# Patient Record
Sex: FEMALE | Race: WHITE | Employment: FULL TIME | URBAN - METROPOLITAN AREA
[De-identification: names, ages, dates, MRNs, and addresses within clinical notes are randomized per-mention and may not be internally consistent; named-entity substitution may affect disease eponyms.]

---

## 2017-03-03 PROBLEM — Z34.90 PREGNANCY: Status: ACTIVE | Noted: 2017-03-03

## 2017-03-04 ENCOUNTER — ANESTHESIA (OUTPATIENT)
Dept: LABOR AND DELIVERY | Age: 27
End: 2017-03-04
Payer: COMMERCIAL

## 2017-03-04 ENCOUNTER — ANESTHESIA EVENT (OUTPATIENT)
Dept: LABOR AND DELIVERY | Age: 27
End: 2017-03-04
Payer: COMMERCIAL

## 2017-03-04 PROCEDURE — 74011000250 HC RX REV CODE- 250

## 2017-03-04 PROCEDURE — 77030014125 HC TY EPDRL BBMI -B: Performed by: ANESTHESIOLOGY

## 2017-03-04 RX ORDER — LIDOCAINE HYDROCHLORIDE AND EPINEPHRINE 20; 5 MG/ML; UG/ML
INJECTION, SOLUTION EPIDURAL; INFILTRATION; INTRACAUDAL; PERINEURAL AS NEEDED
Status: DISCONTINUED | OUTPATIENT
Start: 2017-03-04 | End: 2017-03-04 | Stop reason: HOSPADM

## 2017-03-04 RX ORDER — BUPIVACAINE HYDROCHLORIDE 2.5 MG/ML
INJECTION, SOLUTION EPIDURAL; INFILTRATION; INTRACAUDAL AS NEEDED
Status: DISCONTINUED | OUTPATIENT
Start: 2017-03-04 | End: 2017-03-04 | Stop reason: HOSPADM

## 2017-03-04 RX ADMIN — LIDOCAINE HYDROCHLORIDE AND EPINEPHRINE 3 ML: 20; 5 INJECTION, SOLUTION EPIDURAL; INFILTRATION; INTRACAUDAL; PERINEURAL at 08:34

## 2017-03-04 RX ADMIN — BUPIVACAINE HYDROCHLORIDE 6 ML: 2.5 INJECTION, SOLUTION EPIDURAL; INFILTRATION; INTRACAUDAL at 08:34

## 2017-03-04 NOTE — ANESTHESIA PROCEDURE NOTES
Epidural Block    Start time: 3/4/2017 8:45 AM  End time: 3/4/2017 8:45 AM  Performed by: Andria Madison  Authorized by: Danuta MCELROY     Pre-Procedure  Indication: labor epidural    Preanesthetic Checklist: patient identified, risks and benefits discussed, anesthesia consent, timeout performed and anesthesia consent      Epidural:   Patient position:  Seated  Prep region:  Lumbar  Prep: Betadine    Location:  L3-4    Needle and Epidural Catheter:   Needle Type:  Tuohy  Needle Gauge:  17 G  Injection Technique:  Loss of resistance using saline  Attempts:  1  Catheter Size:  18 G  Events: no paresthesia and negative aspiration test    Test Dose:  Lidocaine 1.5% w/ epi and negative    Assessment:   Catheter Secured:  Tegaderm and tape  Insertion:  Uncomplicated  Patient tolerance:  Patient tolerated the procedure well with no immediate complications

## 2017-03-04 NOTE — ANESTHESIA PREPROCEDURE EVALUATION
Anesthetic History   No history of anesthetic complications            Review of Systems / Medical History  Patient summary reviewed, nursing notes reviewed and pertinent labs reviewed    Pulmonary  Within defined limits                 Neuro/Psych   Within defined limits           Cardiovascular  Within defined limits                Exercise tolerance: >4 METS     GI/Hepatic/Renal  Within defined limits              Endo/Other  Within defined limits           Other Findings              Physical Exam    Airway  Mallampati: II  TM Distance: 4 - 6 cm  Neck ROM: normal range of motion   Mouth opening: Normal     Cardiovascular               Dental         Pulmonary                 Abdominal         Other Findings            Anesthetic Plan    ASA: 2  Anesthesia type: epidural          Induction: Intravenous  Anesthetic plan and risks discussed with: Patient

## 2019-06-26 LAB
ANTIBODY SCREEN, EXTERNAL: NEGATIVE
HBSAG, EXTERNAL: NEGATIVE
HCT, EXTERNAL: 35.3
HGB, EXTERNAL: 12.2
HIV, EXTERNAL: NEGATIVE
PLATELET CNT,   EXTERNAL: 133
RPR, EXTERNAL: NORMAL
RUBELLA, EXTERNAL: NORMAL
TYPE, ABO & RH, EXTERNAL: NORMAL

## 2019-12-11 LAB — GRBS, EXTERNAL: NEGATIVE

## 2020-01-08 ENCOUNTER — HOSPITAL ENCOUNTER (INPATIENT)
Age: 30
LOS: 3 days | Discharge: HOME | End: 2020-01-11
Attending: OBSTETRICS & GYNECOLOGY | Admitting: OBSTETRICS & GYNECOLOGY
Payer: COMMERCIAL

## 2020-01-08 LAB
BASOPHILS # BLD: 0 K/UL (ref 0–0.1)
BASOPHILS NFR BLD: 0 % (ref 0–1)
DIFFERENTIAL METHOD BLD: ABNORMAL
EOSINOPHIL # BLD: 0.2 K/UL (ref 0–0.4)
EOSINOPHIL NFR BLD: 1 % (ref 0–7)
ERYTHROCYTE [DISTWIDTH] IN BLOOD BY AUTOMATED COUNT: 13.5 % (ref 11.5–14.5)
HCT VFR BLD AUTO: 39.4 % (ref 35–47)
HGB BLD-MCNC: 14 G/DL (ref 11.5–16)
IMM GRANULOCYTES # BLD AUTO: 0.1 K/UL (ref 0–0.04)
IMM GRANULOCYTES NFR BLD AUTO: 1 % (ref 0–0.5)
LYMPHOCYTES # BLD: 1.8 K/UL (ref 0.8–3.5)
LYMPHOCYTES NFR BLD: 13 % (ref 12–49)
MCH RBC QN AUTO: 33.9 PG (ref 26–34)
MCHC RBC AUTO-ENTMCNC: 35.5 G/DL (ref 30–36.5)
MCV RBC AUTO: 95.4 FL (ref 80–99)
MONOCYTES # BLD: 0.6 K/UL (ref 0–1)
MONOCYTES NFR BLD: 5 % (ref 5–13)
NEUTS SEG # BLD: 10.8 K/UL (ref 1.8–8)
NEUTS SEG NFR BLD: 80 % (ref 32–75)
NRBC # BLD: 0 K/UL (ref 0–0.01)
NRBC BLD-RTO: 0 PER 100 WBC
PLATELET # BLD AUTO: 143 K/UL (ref 150–400)
PMV BLD AUTO: 10.1 FL (ref 8.9–12.9)
RBC # BLD AUTO: 4.13 M/UL (ref 3.8–5.2)
WBC # BLD AUTO: 13.5 K/UL (ref 3.6–11)

## 2020-01-08 PROCEDURE — 85025 COMPLETE CBC W/AUTO DIFF WBC: CPT

## 2020-01-08 PROCEDURE — 75410000003 HC RECOV DEL/VAG/CSECN EA 0.5 HR: Performed by: OBSTETRICS & GYNECOLOGY

## 2020-01-08 PROCEDURE — 75410000000 HC DELIVERY VAGINAL/SINGLE: Performed by: OBSTETRICS & GYNECOLOGY

## 2020-01-08 PROCEDURE — 36415 COLL VENOUS BLD VENIPUNCTURE: CPT

## 2020-01-08 PROCEDURE — 76060000078 HC EPIDURAL ANESTHESIA: Performed by: NURSE ANESTHETIST, CERTIFIED REGISTERED

## 2020-01-08 PROCEDURE — 77030018846 HC SOL IRR STRL H20 ICUM -A

## 2020-01-08 PROCEDURE — 74011250636 HC RX REV CODE- 250/636: Performed by: OBSTETRICS & GYNECOLOGY

## 2020-01-08 PROCEDURE — 59200 INSERT CERVICAL DILATOR: CPT | Performed by: OBSTETRICS & GYNECOLOGY

## 2020-01-08 PROCEDURE — 77030028565 HC CATH CERV RIPNG BLN COOK -B

## 2020-01-08 PROCEDURE — 75410000002 HC LABOR FEE PER 1 HR: Performed by: OBSTETRICS & GYNECOLOGY

## 2020-01-08 PROCEDURE — 65270000029 HC RM PRIVATE

## 2020-01-08 RX ORDER — OXYTOCIN/0.9 % SODIUM CHLORIDE 30/500 ML
0-20 PLASTIC BAG, INJECTION (ML) INTRAVENOUS
Status: DISCONTINUED | OUTPATIENT
Start: 2020-01-09 | End: 2020-01-11 | Stop reason: HOSPADM

## 2020-01-08 RX ORDER — SODIUM CHLORIDE 0.9 % (FLUSH) 0.9 %
5-40 SYRINGE (ML) INJECTION AS NEEDED
Status: DISCONTINUED | OUTPATIENT
Start: 2020-01-08 | End: 2020-01-11 | Stop reason: HOSPADM

## 2020-01-08 RX ORDER — ONDANSETRON 2 MG/ML
4 INJECTION INTRAMUSCULAR; INTRAVENOUS
Status: DISCONTINUED | OUTPATIENT
Start: 2020-01-08 | End: 2020-01-09 | Stop reason: SDUPTHER

## 2020-01-08 RX ORDER — SODIUM CHLORIDE, SODIUM LACTATE, POTASSIUM CHLORIDE, CALCIUM CHLORIDE 600; 310; 30; 20 MG/100ML; MG/100ML; MG/100ML; MG/100ML
125 INJECTION, SOLUTION INTRAVENOUS CONTINUOUS
Status: DISCONTINUED | OUTPATIENT
Start: 2020-01-09 | End: 2020-01-11 | Stop reason: HOSPADM

## 2020-01-08 RX ORDER — SODIUM CHLORIDE 0.9 % (FLUSH) 0.9 %
5-40 SYRINGE (ML) INJECTION EVERY 8 HOURS
Status: DISCONTINUED | OUTPATIENT
Start: 2020-01-08 | End: 2020-01-11 | Stop reason: HOSPADM

## 2020-01-08 RX ORDER — OXYTOCIN/0.9 % SODIUM CHLORIDE 30/500 ML
0-20 PLASTIC BAG, INJECTION (ML) INTRAVENOUS
Status: DISCONTINUED | OUTPATIENT
Start: 2020-01-09 | End: 2020-01-08

## 2020-01-08 RX ORDER — LORATADINE 10 MG/1
10 TABLET ORAL
COMMUNITY

## 2020-01-08 RX ORDER — AMOXICILLIN 200 MG/5ML
200 SUSPENSION, RECONSTITUTED, ORAL (ML) ORAL 2 TIMES DAILY
COMMUNITY
End: 2020-01-11

## 2020-01-08 RX ORDER — SODIUM CHLORIDE, SODIUM LACTATE, POTASSIUM CHLORIDE, CALCIUM CHLORIDE 600; 310; 30; 20 MG/100ML; MG/100ML; MG/100ML; MG/100ML
1000 INJECTION, SOLUTION INTRAVENOUS ONCE
Status: COMPLETED | OUTPATIENT
Start: 2020-01-08 | End: 2020-01-08

## 2020-01-08 RX ORDER — NALOXONE HYDROCHLORIDE 0.4 MG/ML
0.4 INJECTION, SOLUTION INTRAMUSCULAR; INTRAVENOUS; SUBCUTANEOUS AS NEEDED
Status: DISCONTINUED | OUTPATIENT
Start: 2020-01-08 | End: 2020-01-09 | Stop reason: SDUPTHER

## 2020-01-08 RX ADMIN — SODIUM CHLORIDE, SODIUM LACTATE, POTASSIUM CHLORIDE, AND CALCIUM CHLORIDE 1000 ML: 600; 310; 30; 20 INJECTION, SOLUTION INTRAVENOUS at 13:37

## 2020-01-08 RX ADMIN — SODIUM CHLORIDE, SODIUM LACTATE, POTASSIUM CHLORIDE, AND CALCIUM CHLORIDE: 600; 310; 30; 20 INJECTION, SOLUTION INTRAVENOUS at 23:40

## 2020-01-08 NOTE — PROGRESS NOTES
1251 - Patient arrived ambulatory from office with reports of 6/8 BPP (for breathing). Patient and FOB state this same situation happened with their first baby 3 years ago. Patient is scheduled for an induction Friday. Patient denies any LOF or vaginal bleeding. Patient is currently on amoxicillin for a sinus infection, last took tylenol cold yesterday. Patient reports eating and drinking normally. 1255 - Patient tilted on left side with pillow support. 1305 - Patient tilted on right side with pillow support. Patient is javier on monitor but not feeling any contractions at this time. 1310 - FHR tracing reviewed with Dr. Clair Nyhan, as Dr. Joselito Joya is currently in OR. Pattie Rodríguez from MD for PIV, labs and IVF bolus for FHR baseline 150-155 with prolonged accelerations into the 70's.     1332 - PIV inserted in left hand on second attempt, labs drawn on first attempt and sent, IVF bolus initiated. [address] from Dr. Joselito Joya to admit patient for induction of labor. 80 - Dr. Joselito Joya at bedside discussing plan of care. SVE = 2-3/75/-2. Cook catheter placed by Dr. Joselito Joya, 60ml/60ml. VORB for EFM x1 hr post nielson placement and pitocin titration at midnight. 1900 - Bedside and Verbal shift change report given to Hal Ibanez RN (oncoming nurse) by Caren Mckeon RN (offgoing nurse). Report included the following information SBAR, Procedure Summary, Intake/Output, MAR, Recent Results and Med Rec Status.

## 2020-01-08 NOTE — H&P
History & Physical    Name: Maudie Gilford MRN: [id number]  SSN: [id number]    YOB: 1990  Age: 30 y.o. Sex: female      Subjective:     Estimated Date of Delivery: 20  OB History    Para Term  AB Living   2 1 1     1   SAB TAB Ectopic Molar Multiple Live Births             1      # Outcome Date GA Lbr Ayden/2nd Weight Sex Delivery Anes PTL Lv   2 Current            1 Term 17 40w1d   F Vag-Spont  N MEGHNA       Ms. Marie is admitted with pregnancy at 40w2d for induction of labor due to  BPP in the office today. Additionally has a favorable cervix. Prenatal course was normal.  Please see prenatal records for details. No past medical history on file. No past surgical history on file. Social History     Occupational History    Not on file   Tobacco Use    Smoking status: Never Smoker   Substance and Sexual Activity    Alcohol use: No    Drug use: No    Sexual activity: Yes     Partners: Male     No family history on file. Allergies   Allergen Reactions    Sulfa (Sulfonamide Antibiotics) Hives     Prior to Admission medications    Medication Sig Start Date End Date Taking? Authorizing Provider   loratadine (CLARITIN) 10 mg tablet Take 10 mg by mouth. Yes Provider, Historical   amoxicillin (AMOXIL) 200 mg/5 mL suspension Take 200 mg by mouth two (2) times a day. Indications: Acute Bacterial Infection of the Sinuses   Yes Provider, Historical   PNV no.24-iron-folic acid-dha (PRENATAL DHA+COMPLETE PRENATAL) -300 mg-mcg-mg cmpk Take 1 Tab by mouth. Yes Provider, Historical   ibuprofen (MOTRIN) 600 mg tablet Take 1 Tab by mouth every six (6) hours as needed. 3/6/17   Jeromy Diaz MD        Review of Systems: A comprehensive review of systems was negative except for that written in the History of Present Illness.     Objective:     Vitals:  Vitals:    20 1257 20 1259   BP:  111/69   Pulse:  78   Resp:  14   Temp:  98.3 °F (36.8 °C)   Weight: 73.5 kg (162 lb)    Height: 5' 4\" (1.626 m)         Physical Exam:  Patient without distress. Heart: Regular rate and rhythm  Lung: normal respiratory effort  Abdomen: soft, nontender  Fundus: soft and non tender  Perineum: blood absent, amniotic fluid absent  Cervical Exam: 2-3/75/-2; cook catheter placed easily and filled with 60ml NS x2   Lower Extremities: WWP   Membranes:  Intact  Fetal Heart Rate: Reactive          Prenatal Labs:   Lab Results   Component Value Date/Time    Rubella, External Immune 08/10/2016    HBsAg, External Negative 08/10/2016    HIV, External Negative 08/10/2016    ABO,Rh O Positive 08/10/2016    GrBStrep, External negative 02/10/2017       Impression/Plan:     Active Problems:  IOL @ 40w2d    BPP        Plan: Admit for induction of labor. Group B Strep negative. FB in place overnight.    Pit per protocol in the AM    CEFM/[place]  Consented  IVF/Clears  Pain management if desired   Expect        Jorge Zafar MD  [facility]

## 2020-01-09 ENCOUNTER — ANESTHESIA EVENT (OUTPATIENT)
Dept: LABOR AND DELIVERY | Age: 30
End: 2020-01-09
Payer: COMMERCIAL

## 2020-01-09 ENCOUNTER — ANESTHESIA (OUTPATIENT)
Dept: LABOR AND DELIVERY | Age: 30
End: 2020-01-09
Payer: COMMERCIAL

## 2020-01-09 PROCEDURE — 74011250636 HC RX REV CODE- 250/636: Performed by: OBSTETRICS & GYNECOLOGY

## 2020-01-09 PROCEDURE — 74011000250 HC RX REV CODE- 250: Performed by: NURSE ANESTHETIST, CERTIFIED REGISTERED

## 2020-01-09 PROCEDURE — 77030014125 HC TY EPDRL BBMI -B: Performed by: NURSE ANESTHETIST, CERTIFIED REGISTERED

## 2020-01-09 PROCEDURE — 0KQM0ZZ REPAIR PERINEUM MUSCLE, OPEN APPROACH: ICD-10-PCS | Performed by: OBSTETRICS & GYNECOLOGY

## 2020-01-09 PROCEDURE — 00HU33Z INSERTION OF INFUSION DEVICE INTO SPINAL CANAL, PERCUTANEOUS APPROACH: ICD-10-PCS | Performed by: ANESTHESIOLOGY

## 2020-01-09 PROCEDURE — 65270000029 HC RM PRIVATE

## 2020-01-09 PROCEDURE — 74011250637 HC RX REV CODE- 250/637: Performed by: OBSTETRICS & GYNECOLOGY

## 2020-01-09 PROCEDURE — 3E033VJ INTRODUCTION OF OTHER HORMONE INTO PERIPHERAL VEIN, PERCUTANEOUS APPROACH: ICD-10-PCS | Performed by: OBSTETRICS & GYNECOLOGY

## 2020-01-09 PROCEDURE — 75410000002 HC LABOR FEE PER 1 HR: Performed by: OBSTETRICS & GYNECOLOGY

## 2020-01-09 PROCEDURE — 10907ZC DRAINAGE OF AMNIOTIC FLUID, THERAPEUTIC FROM PRODUCTS OF CONCEPTION, VIA NATURAL OR ARTIFICIAL OPENING: ICD-10-PCS | Performed by: OBSTETRICS & GYNECOLOGY

## 2020-01-09 PROCEDURE — 74011000250 HC RX REV CODE- 250: Performed by: ANESTHESIOLOGY

## 2020-01-09 RX ORDER — EPHEDRINE SULFATE/0.9% NACL/PF 50 MG/5 ML
10 SYRINGE (ML) INTRAVENOUS
Status: COMPLETED | OUTPATIENT
Start: 2020-01-09 | End: 2020-01-09

## 2020-01-09 RX ORDER — NALOXONE HYDROCHLORIDE 0.4 MG/ML
0.4 INJECTION, SOLUTION INTRAMUSCULAR; INTRAVENOUS; SUBCUTANEOUS AS NEEDED
Status: DISCONTINUED | OUTPATIENT
Start: 2020-01-09 | End: 2020-01-11 | Stop reason: HOSPADM

## 2020-01-09 RX ORDER — LIDOCAINE HYDROCHLORIDE AND EPINEPHRINE 15; 5 MG/ML; UG/ML
INJECTION, SOLUTION EPIDURAL
Status: SHIPPED | OUTPATIENT
Start: 2020-01-09 | End: 2020-01-09

## 2020-01-09 RX ORDER — BUPIVACAINE HYDROCHLORIDE 2.5 MG/ML
INJECTION, SOLUTION EPIDURAL; INFILTRATION; INTRACAUDAL AS NEEDED
Status: DISCONTINUED | OUTPATIENT
Start: 2020-01-09 | End: 2020-01-09 | Stop reason: HOSPADM

## 2020-01-09 RX ORDER — DIPHENHYDRAMINE HCL 25 MG
25 CAPSULE ORAL
Status: DISCONTINUED | OUTPATIENT
Start: 2020-01-09 | End: 2020-01-11 | Stop reason: HOSPADM

## 2020-01-09 RX ORDER — HYDROCORTISONE ACETATE PRAMOXINE HCL 2.5; 1 G/100G; G/100G
CREAM TOPICAL AS NEEDED
Status: DISCONTINUED | OUTPATIENT
Start: 2020-01-09 | End: 2020-01-11 | Stop reason: HOSPADM

## 2020-01-09 RX ORDER — OXYTOCIN/0.9 % SODIUM CHLORIDE 20/1000 ML
125-500 PLASTIC BAG, INJECTION (ML) INTRAVENOUS ONCE
Status: COMPLETED | OUTPATIENT
Start: 2020-01-09 | End: 2020-01-09

## 2020-01-09 RX ORDER — IBUPROFEN 800 MG/1
800 TABLET ORAL EVERY 8 HOURS
Status: DISCONTINUED | OUTPATIENT
Start: 2020-01-09 | End: 2020-01-11 | Stop reason: HOSPADM

## 2020-01-09 RX ORDER — EPHEDRINE SULFATE/0.9% NACL/PF 50 MG/5 ML
10 SYRINGE (ML) INTRAVENOUS ONCE
Status: COMPLETED | OUTPATIENT
Start: 2020-01-09 | End: 2020-01-09

## 2020-01-09 RX ORDER — FENTANYL/BUPIVACAINE/NS/PF 2-1250MCG
10 PREFILLED PUMP RESERVOIR EPIDURAL CONTINUOUS
Status: DISCONTINUED | OUTPATIENT
Start: 2020-01-09 | End: 2020-01-09 | Stop reason: HOSPADM

## 2020-01-09 RX ORDER — SIMETHICONE 80 MG
80 TABLET,CHEWABLE ORAL
Status: DISCONTINUED | OUTPATIENT
Start: 2020-01-09 | End: 2020-01-11 | Stop reason: HOSPADM

## 2020-01-09 RX ORDER — METHYLERGONOVINE MALEATE 0.2 MG/ML
0.2 INJECTION INTRAVENOUS ONCE
Status: COMPLETED | OUTPATIENT
Start: 2020-01-09 | End: 2020-01-09

## 2020-01-09 RX ORDER — DOCUSATE SODIUM 100 MG/1
100 CAPSULE, LIQUID FILLED ORAL
Status: DISCONTINUED | OUTPATIENT
Start: 2020-01-09 | End: 2020-01-11 | Stop reason: HOSPADM

## 2020-01-09 RX ORDER — ONDANSETRON 2 MG/ML
4 INJECTION INTRAMUSCULAR; INTRAVENOUS
Status: DISCONTINUED | OUTPATIENT
Start: 2020-01-09 | End: 2020-01-11 | Stop reason: HOSPADM

## 2020-01-09 RX ADMIN — METHYLERGONOVINE MALEATE 0.2 MG: 0.2 INJECTION INTRAVENOUS at 14:14

## 2020-01-09 RX ADMIN — IBUPROFEN 800 MG: 800 TABLET ORAL at 16:58

## 2020-01-09 RX ADMIN — Medication 10 MG: at 08:49

## 2020-01-09 RX ADMIN — SODIUM CHLORIDE, SODIUM LACTATE, POTASSIUM CHLORIDE, AND CALCIUM CHLORIDE 500 ML/HR: 600; 310; 30; 20 INJECTION, SOLUTION INTRAVENOUS at 08:22

## 2020-01-09 RX ADMIN — SODIUM CHLORIDE, SODIUM LACTATE, POTASSIUM CHLORIDE, AND CALCIUM CHLORIDE 125 ML/HR: 600; 310; 30; 20 INJECTION, SOLUTION INTRAVENOUS at 09:36

## 2020-01-09 RX ADMIN — SODIUM CHLORIDE, SODIUM LACTATE, POTASSIUM CHLORIDE, AND CALCIUM CHLORIDE 999 ML/HR: 600; 310; 30; 20 INJECTION, SOLUTION INTRAVENOUS at 07:19

## 2020-01-09 RX ADMIN — OXYTOCIN 5000 MILLI-UNITS/HR: 10 INJECTION, SOLUTION INTRAMUSCULAR; INTRAVENOUS at 14:59

## 2020-01-09 RX ADMIN — BUPIVACAINE HYDROCHLORIDE 2 ML: 2.5 INJECTION, SOLUTION EPIDURAL; INFILTRATION; INTRACAUDAL; PERINEURAL at 09:55

## 2020-01-09 RX ADMIN — Medication 10 MG: at 08:44

## 2020-01-09 RX ADMIN — LIDOCAINE HYDROCHLORIDE AND EPINEPHRINE 2 ML: 15; 5 INJECTION, SOLUTION EPIDURAL at 08:33

## 2020-01-09 RX ADMIN — BUPIVACAINE HYDROCHLORIDE 3 ML: 2.5 INJECTION, SOLUTION EPIDURAL; INFILTRATION; INTRACAUDAL; PERINEURAL at 08:36

## 2020-01-09 RX ADMIN — LIDOCAINE HYDROCHLORIDE AND EPINEPHRINE 3 ML: 15; 5 INJECTION, SOLUTION EPIDURAL at 08:30

## 2020-01-09 RX ADMIN — Medication 2 MILLI-UNITS/MIN: at 03:52

## 2020-01-09 RX ADMIN — SODIUM CHLORIDE, SODIUM LACTATE, POTASSIUM CHLORIDE, AND CALCIUM CHLORIDE 125 ML/HR: 600; 310; 30; 20 INJECTION, SOLUTION INTRAVENOUS at 03:52

## 2020-01-09 RX ADMIN — Medication 8 ML/HR: at 09:38

## 2020-01-09 NOTE — ROUTINE PROCESS
0700: Bedside, Verbal and Written shift change report given to MAXINE Pineda, RN (oncoming nurse) by OLYA Manzo, DARON (offgoing nurse). Report included the following information SBAR, Kardex, Intake/Output, MAR, Accordion and Recent Results. 9524: Pt requesting pre-epidural fluid bolus be started at this time    0727: This RN updating MAURICE Rooney on pt epidural request. This RN to call CRNA when 1 liter LR IV fluid bolus completed. 6467: Dr. Joselito Joya calling for patient update. This RN updating MD on pt status, EFM, pitocin, and pt bolusing for epidural. MD verbalizing understanding and stating that she will be at pt bedside around 0815. Plan is for MD to remove cook catheter and assess pt cervix. 5227: MAURICE Rooney at pt bedside at this time    0830: Test dose at this time per MAURICE Rooney    4876: Dr. Joselito Joya at pt bedside at this time for pt assessment    0836: MD removing cook catheter at this time, pt tolerating well    0838: SVE per MD, 7-8/90/-2. AROM per MD, moderate amount of clear fluid noted. Regina care performed and pad changed. 0840: Dr. Kelsey Paulino increase pitocin to 10mL/hr at this time due to pt assessment of mild contraction during SVE. MD discussing plan of care with pt at this time, pt verbalizing agreement    0843: Dr. Joselito Joya leaving pt bedside at this time    8805: Pt stating that she feels nauseated, this RN reassessing bp. 72/43 noted. This RN calling MAURICE Rooney to update on pt status and administering 10mg ephedrine at this time    0845: MAURICE Rooney at pt bedside a this time performing pt assessment    0846: Pt bp noted to be 109/61. No concerns by CRNA at this time. 5720: Pt stating that her nausea is subsiding    0849: Pt stating she is beginning to feel nauseated again. This RN reassessing bp, 88/57 noted. This RN calling MAURICE Rooney to update on pt status.  CRNA TORB additional 10mg ephedrine IV and CRNA on her way to assess pt    0852: BP retake is 140/81, this RN switching cuff to L arm due to pt laying on cuff on R arm/Rside. Dr. Landen Hall at pt bedside at this time for pt assessment. Pt stating she feels \"much better. \"     3228: BP retake is 123/61. MD verbalizing no concerns at this time. MD VORB modify epidural order to continuous rate of 8mL/hr. MD leaving pt bedside at this time    [phone]: This RN calling Dr. Steffanie Baxter and updating MD on pt status and EFM/fetal tachycardia since ephedrine. MD verbalizing understanding and VORB continue pitocin titration. No further orders received at this time    1025: Pt informing this RN that she is feeling increasing discomfort with her contractions    1027: This RN calling MAURICE Foster and updating on pt status and increasing discomfort. CRNA TORB have pt hit epidural bolus button and increase continuous rate to 10mL/hr. CRNA to come assess pt shortly    1028: SVE per this RN due to pt increasing pain with contractions and FHR    1050: MAURICE Foster at pt bedside at this time assessing pt pain and level and administering small bolus at this time for pt pain control. Pt educated by CRNA to inform RN or CRNA if pt nausea returns     1140: Dr. Sonny Flynn this RN to perform SVE at this time, MD to check pt chart following SVE    1144: SVE per this RN, 10/100/-1 noted. This RN to assist pt with side lying hip release at this time    1159: Dr. Steffanie Baxter at pt bedside at this time    1200: Dr. Steffanie Baxter at pt perineum. Pt in lithotomy position with legs in stirrups. Pt educated how to push and now bearing down with each contraction    1207: Dr. Steffanie Baxter discussing plan of care with patient. MD stating that plan is to take break from pushing and restart pushing when lower fetal station/when pt is feeling rectal pressure and urge to push. Pt verbalizing agreement with plan of care    1230: Dr. Steffanie Baxter leaving nurses station at this time. Plan is to allow pt to rest, to restart pushing when appropriate. Dr. Richardson Son to care to pt until later in the afternoon.  Dr. Steffanie Baxter to call this RN for pt update later in the afternoon. 1327: SVE due to FHR and pt complaint per GELY Mendez RN that pt is beginning to experience discomfort with contractions. 10/+1 noted. 1332: This RN calling Dr. Edilberto Bai to update on pt status, EFM/fetal continued fetal tachycardia, pt mild rectal pressure, and SVE with +1 station. MD stating she will review tracing and come to nurses station to further discuss plan of care    1339: Dr. Edilberto Bai reviewing EFM at nurses station at this time. MD stating she will go to pt bedside to assess pt and discuss plan of care    1341: Dr. Edilberto Bai at pt bedside at this time discussing plan of care. Plan is to start pushing with MD at this time, pt verbalizing agreement. 1346: Pt in lithotomy position with legs in stirrups, pt educated how to push, pt now bearing down with each contraction. MD remaining at pt perineum    1400: Infant head out, MD assessing for nuchals, 1 loose nuchal noted and reduced.  viable female infant, infant placed on maternal abdomen and dried and sitmulated    36: Dr. Raquel Pollock 0.2mg methergine IM for postpartum bleeding. MD and this RN providing continuous fundal massage. 1408: This RN and MD noting fundus now firm and bleeding scant/small. MD confirming still administer methergine IM    1414: Brayden Newton RN administering 0.2mg methergine IM at this time    1415: Dr. Edilberto Bai leaving pt bedside at this time    1502: PPP titrated down to 125mL/hr    1720: TRANSFER - OUT REPORT:    Verbal report given to ALEX Kendrick RN (name) on Cely Brumfield  being transferred to MIU (unit) for routine progression of care       Report consisted of patients Situation, Background, Assessment and   Recommendations(SBAR). Information from the following report(s) SBAR, Kardex, Intake/Output, MAR, Accordion and Recent Results was reviewed with the receiving nurse.     Lines:   Peripheral IV 20 Left;Posterior Hand (Active)   Site Assessment Clean, dry, & intact 1/9/2020  2:25 PM   Phlebitis Assessment 0 1/9/2020  2:25 PM   Infiltration Assessment 0 1/9/2020  2:25 PM   Dressing Status Clean, dry, & intact 1/9/2020  2:25 PM   Dressing Type Tape;Transparent 1/9/2020  2:25 PM   Hub Color/Line Status Pink 1/9/2020  2:25 PM   Action Taken Blood drawn 1/8/2020  1:37 PM   Alcohol Cap Used Yes 1/9/2020  2:25 PM        Opportunity for questions and clarification was provided.       Patient transported with:   Registered Nurse  Tech

## 2020-01-09 NOTE — L&D DELIVERY NOTE
Delivery Note:     Patient reached FD and pushed with good effort to deliver the fetal head in SHAMA position. 1 nuchal cord found easily reduced on the perineum. The anterior shoulder, followed by the posterior shoulder and the rest of the body then delivered easily. This was a GIRL with Apgars of 8 and 9 at 1 and 5 minutes respectively, weight pending. The infant was placed on mom's abdomen. The cord was then double clamped and cut by the FOB. Cord blood was taken. The placenta followed spontaneously, intact, with 3VC. Pitocin was added to the IVF and the fundus was initially boggy to palpation with bleeding present. Bimanual massage performed and tone improved. 1 dose of IM methergine given. The vagina, cervix and perineum were examined and 2nd degree laceration was found and repaired to hemostasis using 3-0 vicryl suture.  mL. No complications. Mom and baby doing well. Dr. Garcia Hard delivering. [facility], [address] [place] [address],[address] [facility]     Delivery Summary    Patient: Ledy Macdonald MRN: [id number]  SSN: [id number]    YOB: 1990  Age: 30 y.o. Sex: female       Information for the patient's :  Fito Peters, Female Delmon Mole [[id number]]       Labor Events:    Labor: No    Steroids: None   Cervical Ripening Date/Time:       Cervical Ripening Type: Flores/EASI   Antibiotics During Labor: No   Rupture Identifier:      Rupture Date/Time: 2020 8:38 AM   Rupture Type: AROM   Amniotic Fluid Volume: Moderate    Amniotic Fluid Description: Clear    Amniotic Fluid Odor:      Induction: Flores Bulb (balloon); Oxytocin;AROM       Induction Date/Time: 2020 3:52 AM    Indications for Induction: Other(comment)    Augmentation: None   Augmentation Date/Time:      Indications for Augmentation:     Labor complications:  Other (comment)       Additional complications:        Delivery Events:  Indications For Episiotomy:     Episiotomy: None   Perineal Laceration(s):     Repaired:     Periurethral Laceration Location:      Repaired:     Labial Laceration Location:     Repaired:     Sulcal Laceration Location:     Repaired:     Vaginal Laceration Location:     Repaired:     Cervical Laceration Location:     Repaired:     Repair Suture: Vicryl 3-0   Number of Repair Packets: 1   Estimated Blood Loss (ml):  ml     Delivery Date: 2020    Delivery Time: 2:00 PM  Delivery Type: Vaginal, Spontaneous  Sex:  Female    Gestational Age: 44w3d   Delivery Clinician:  Janett Nunes  Living Status: Living   Delivery Location: L&D            APGARS  One minute Five minutes Ten minutes   Skin color: 0   1        Heart rate: 2   2        Grimace: 2   2        Muscle tone: 2   2        Breathin   2        Totals: 8   9            Presentation: Vertex    Position:   Occiput    Resuscitation Method:  Suctioning-bulb; Tactile Stimulation     Meconium Stained: Terminal      Cord Information: 3 Vessels  Complications: Nuchal Cord With Compressions  Cord around: head  Delayed cord clamping? Yes  Cord clamped date/time:2020  2:01 PM  Disposition of Cord Blood: Lab    Blood Gases Sent?: No    Placenta:  Date/Time:    Removal: Expressed      Appearance: Normal     [place] Measurements:  Birth Weight:        Birth Length:        Head Circumference:        Chest Circumference:       Abdominal Girth: Other Providers:   MARIE Poon;OSIEL MARIN JENNIFER Judee Spiegel, Obstetrician;Primary Nurse;Primary  Nurse;Nursery Nurse;Charge Nurse           Group B Strep:   Lab Results   Component Value Date/Time    GrBStrep, External negative 2019     Information for the patient's :  Ajith Nevarez, Female Jose Cohn [[id number]]   No results found for: ABORH, PCTABR, PCTDIG, BILI, ABORHEXT, ABORH    No results for input(s): PCO2CB, PO2CB, HCO3I, SO2I, IBD, PTEMPI, SPECTI, PHICB, ISITE, IDEV, IALLEN in the last 72 hours.

## 2020-01-09 NOTE — ANESTHESIA PROCEDURE NOTES
Epidural Block    Start time: 1/9/2020 8:15 AM  End time: 1/9/2020 8:41 AM  Performed by: Kavon Nguyen CRNA  Authorized by: Kavon Nguyen CRNA     Pre-Procedure  Indication: labor epidural    Preanesthetic Checklist: patient identified, risks and benefits discussed, anesthesia consent, patient being monitored, timeout performed and anesthesia consent    Timeout Time: 08:15        Epidural:   Patient position:  Seated  Prep region:  Lumbar  Prep: Chlorhexidine    Location:  L3-4    Needle and Epidural Catheter:   Needle Type:  Tuohy  Needle Gauge:  17 G  Injection Technique:  Loss of resistance using saline  Attempts:  1  Catheter Size:  20 G  Catheter at Skin Depth (cm):  11  Depth in Epidural Space (cm):  5  Events: no blood with aspiration, no cerebrospinal fluid with aspiration, no paresthesia and negative aspiration test    Test Dose:  Negative    Assessment:   Catheter Secured:  Tape and tegaderm  Insertion:  Uncomplicated

## 2020-01-09 NOTE — PROGRESS NOTES
Labor Note    Crystal Parra  [id number]  1990   40w3d      S:  Feeling pain and just got epidural.      O:    Visit Vitals  /79   Pulse 100   Temp 98.2 °F (36.8 °C)   Resp 14   Ht 5' 4\" (1.626 m)   Wt 73.5 kg (162 lb)   SpO2 99%   BMI 27.81 kg/m²     Cervical Exam  Dilation (cm): 7.5  Eff: 90 %  Station: -2  Vaginal exam done by? : Dr. Sandra Viera Status: AROM - clear   FB easily moved. Patient Vitals for the past 4 hrs: Mode Fetal Heart Rate Variability Decelerations Accelerations RN Reviewed Strip? Provider who reviewed strip? FHR Interventions   20 0840 External 160 6-25 BPM None Yes Yes Dr. Natalio Jin    20 0830 External 150 6-25 BPM None Yes Yes Dr. Sadia Senior   20 0815 External 145 (!) >25 BPM None Yes Yes     20 0800 External 150 6-25 BPM None Yes Yes     20 0745 External 145 6-25 BPM None Yes Yes     20 0730 External 145 6-25 BPM None Yes Yes     20 0715 External 140 6-25 BPM None Yes Yes     20 0700 External 145 6-25 BPM None No Yes     20 0645 External 145 6-25 BPM None Yes Yes     20 0630 External 135 6-25 BPM None Yes Yes     20 0615 External 135 6-25 BPM None Yes Yes     20 0600 External 140 6-25 BPM None No Yes     20 0545 External 140 6-25 BPM None Yes Yes     20 0530 External 140 6-25 BPM None Yes Yes  Lateral Right   20 0515 External 140 6-25 BPM None Yes Yes     20 0500 External 140 6-25 BPM None No Yes         A/P:  30 y.o.  @ 40w3d - labor   1. CEFM/[place]  2. GBS - / Rh+  3. Pitocin per protocol   4. Pain control - epidural   5. Genoveva prn. Expect .       Suma Leggett MD  [facility]

## 2020-01-09 NOTE — PROGRESS NOTES
1900  Bedside and Verbal shift change report given to MARIANA Gauthier RN (oncoming nurse) by Vanesa Rivera RN (offgoing nurse). Report included the following information SBAR, Kardex, Procedure Summary, Intake/Output, MAR, Accordion, Recent Results and Med Rec Status. Patient is resting comfortably in bed. EFM and TOCO applied. VS taken and patient assessment completed. Narda Osgood catheter in place, minor spotting noted. [facility]  Dr Yaya Schilling is updated on patient progress. Per Dr. Reilly Stank may be started at 2 am or 4 am, depending on strip and patient. 0  Dr Yaya Schilling updated on contraction pattern. Dr Cintia Anthony also reviewing contraction pattern, and pitocin will not be started at this time. 2340  Non reactive strip. Bolus of LR started. 0010  Reactive strip. Fluid bolus finished. 9155  Patient round. Fluids started. Cook catheter checked. VS and patient assessment    0352  Pitocin is started. 5096  Patient ambulates to bathroom. Pulse oximeter changed from right hand ring finger to left hand, middle finger. Nail polish present on fingers, pulse ox did not read well on right hand. 0700  Bedside and Verbal shift change report given to Landon Latham RN (oncoming nurse) by Samuel Witt RN (offgoing nurse). Report included the following information SBAR, Kardex, Procedure Summary, Intake/Output, MAR, Accordion, Recent Results and Med Rec Status.

## 2020-01-09 NOTE — ROUTINE PROCESS
TRANSFER - IN REPORT:    Verbal report received from [place], RN(name) on [place]  being received from L&D(unit) for routine progression of care      Report consisted of patients Situation, Background, Assessment and   Recommendations(SBAR). Information from the following report(s) SBAR was reviewed with the receiving nurse. Opportunity for questions and clarification was provided. Assessment completed upon patients arrival to unit and care assumed.

## 2020-01-09 NOTE — DISCHARGE INSTRUCTIONS
Discharge Instructions for Vaginal Delivery    Patient ID:  Kasey Green  [id number]  85 y.o.  1990    Take Home Medications       Continue taking your prenatal vitamins if you are breastfeeding. Follow-up care is a key part of your treatment and safety. Please schedule and keep appointments. Follow-up with your primary OB in 6 weeks. Activity  Avoid anything in your vagina for 6 weeks (no intercourse, tampons, or douching). You may drive unless you are taking prescription pain medications. Climbing stairs and light lifting are okay. Please avoid excessive exercise, though walking is okay- you'll be tired! Diet  Regular diet as tolerated. Be sure to drink plenty of fluids if you are breastfeeding. Wound care  If you have stitches, continue to rinse with a squirt bottle of warm water each time you void for about 7-10 days. .  Your stitches will gradually dissolve over four to eight weeks. Sitz baths are also helpful to keep the wound clean, encourage healing, and to help with pain associated with the stitches or hemorrhoids. You can use either a sitz bath basin or a bathtub filled with 2-3\" inches of plain warm water. Soak for 10 minutes 3 times a day as tolerated. Pain Management  1. Over the counter medications such as Tylenol and ibuprofen (Motrin or Advil) are ideal.  These may be taken together, alternating doses. You may  take the maximum dose:  Motrin or Advil (generic ibuprofen), either 3 tablets every 6 hours or 4 tablets every 8 hours or Tylenol (acetominophen) 1000mg every 6 hours (equivalent to 2 extra strength Tylenol). 2. You may also have a precrescription for stronger pain medication. Take only as needed and transition to over the counter medication in the next few days. Minimize amounts of the prescription medication, as it can be habit-forming and will worsen or cause constipation.  Most patients will find that within a couple of days, their pain is adequately controlled using only over-the-counter medications. 3. The prescription pain medication is mixed with Tylenol, therefore, you should not take any extra Tylenol or acetaminophen until you have reduced your prescription pain medication. 4. Add heating pad or sitz baths as needed. Add hemorrhoid wipes or ointments if needed    Constipation  1. Constipation is normal after pregnancy and delivery, especially while taking prescription narcotic pain medication. 2. Over the counter remedies including ducosate (Colace), take 1-2 capsules 1-2 times daily for soft stool as needed. You may also add/ try milk of magnesia or rectal remedies such as Dulcolax or Fleets enema. Recovery: What to Expect at Home  1. Fatigue is expected. Try to rest when you can and don't worry about doing housework or other tasks which can wait. 2. The soreness along your bottom will improve significantly over the first 2 weeks, but it may take 6 weeks before you are completely recovered. 3. Back pain or general body aches or muscle soreness are expected and should improve with acetominophen or ibuprofen. 4. Leg swelling due to pregnancy and/or IV fluids given in the hospital will take about two weeks to resolve. 5. Most women experience some form of the \"Baby Blues\" after having a baby. Feeling emotional, tearful, frustrated, anxious, sad, and irritable some of the time is normal and go away after about 2 weeks. Adequate rest and help from your family will help. Take breaks from caring for the baby. Call your doctor if your symptoms seem severe, last more than 2 weeks, or seem to be getting worse instead of better. Get help immediately if you have thoughts of wanting to hurt yourself or others! Call your doctor or seek immediate medical care if you have:  Heavy vaginal bleeding, soaking through one or more pads an hour for several hours. Foul-smelling discharge from your vagina or incision.   Consistent nausea and vomiting and cannot keep fluids down. Consistent pain that does not get better after you take pain medicine.   Sudden chest pain and shortness of breath  Signs of a blood clot: pain/ swelling/ increasing redness in your lower extremeties  Signs of infection: increased pain in your abdomen or vaginal area; red streaks, warmth, or tenderness of your breasts; fever of 100.5 F or greater

## 2020-01-09 NOTE — PROGRESS NOTES
Labor Note    Kasey Green  [id number]  1990   40w3d      S:  Feeling comfortable with epidural    O:    Visit Vitals  /71   Pulse 100   Temp 98.5 °F (36.9 °C)   Resp 14   Ht 5' 4\" (1.626 m)   Wt 73.5 kg (162 lb)   SpO2 99%   BMI 27.81 kg/m²     Cervical Exam  Dilation (cm): 10  Eff: 100 %  Station: -1  Vaginal exam done by? : Marleny Roche RN  Membrane Status: AROM    Patient Vitals for the past 4 hrs: Mode Fetal Heart Rate Variability Decelerations Accelerations RN Reviewed Strip? Provider who reviewed strip? FHR Interventions   20 1145 External 160 6-25 BPM None Yes Yes     20 1130 External 160 6-25 BPM None Yes Yes     20 1118        Lateral Left   20 1115 External 170 6-25 BPM None No Yes     20 1100 External 175 6-25 BPM None Yes Yes     20 1045 External 165 6-25 BPM (!) Late Yes Yes     20 1041        Other (comment)   20 1030 External 165 6-25 BPM (!) Late Yes Yes     20 1028        Vaginal Exam   20 1025  [place]     Lateral Right   20 1015 External 175 6-25 BPM None Yes Yes     20 1000 External 175 6-25 BPM None Yes Yes     20 0951        Lateral Left   20 0947  [place]   Yes Dr. Sylvie Hayes    20 0945 External 170 6-25 BPM None No Yes     20 0930 External 165 6-25 BPM None Yes Yes     20 0915 External 160 6-25 BPM None Yes Yes     20 0900 External 160 6-25 BPM Variable Yes Yes     20 0845 External 150 6-25 BPM (!) Late Yes Yes Dr. Sylvie Hayes IV Fluid Bolus; Other (comment)   20 0840 External 160 6-25 BPM None Yes Yes Dr. Chikis Gary   20 0830 External 150 6-25 BPM None Yes Yes Dr. Chikis Gary       A/P:  30 y.o.  @ 40w3d - IOL     Attempted pushing, however given station and movement with attempted pushing she would benefit from laboring down some. Will reassess.         Anahi Godoy MD  [facility]

## 2020-01-09 NOTE — DISCHARGE SUMMARY
OBSTETRIC DISCHARGE SUMMARY    Patient ID:  Shaheen Huggins  [id number]  45 y.o.  1990    Admit Date: 2020    Discharge Date: 2020     Admitting Physician: Otto Cervantes MD  Attending Physician: Bernarda Hogan MD    Admission Diagnoses: Pregnancy [Z34.90]    Discharge Diagnoses: Same, delivered  Information for the patient's :  Gilmar Feng Female Love Hein [[id number]]             Additional Diagnoses: none. Principle Procedure:  Dr. Dani Keita    Additional procedures:  none    [id number]       Maternal Labs:   Lab Results   Component Value Date/Time    HBsAg, External negative 2019    HIV, External negative 2019    Rubella, External immune 2019    RPR, External non-reactive 2019    GrBStrep, External negative 2019           History of Present Illness:   OB History        2    Para   1    Term   1            AB        Living   1       SAB        TAB        Ectopic        Molar        Multiple        Live Births   1              Admitted for induction of labor.      Hospital Course:   Routine and uncomplicated        Signed:  Rama Molina DO  2020  2:17 PM

## 2020-01-09 NOTE — ANESTHESIA PREPROCEDURE EVALUATION
Relevant Problems   No relevant active problems       Anesthetic History   No history of anesthetic complications            Review of Systems / Medical History  Patient summary reviewed, nursing notes reviewed and pertinent labs reviewed    Pulmonary  Within defined limits                 Neuro/Psych   Within defined limits           Cardiovascular  Within defined limits                     GI/Hepatic/Renal  Within defined limits              Endo/Other  Within defined limits           Other Findings              Physical Exam    Airway  Mallampati: II  TM Distance: 4 - 6 cm  Neck ROM: normal range of motion   Mouth opening: Normal     Cardiovascular  Regular rate and rhythm,  S1 and S2 normal,  no murmur, click, rub, or gallop             Dental  No notable dental hx       Pulmonary  Breath sounds clear to auscultation               Abdominal  Abdominal exam normal       Other Findings            Anesthetic Plan    ASA: 1  Anesthesia type: epidural            Anesthetic plan and risks discussed with: Patient

## 2020-01-09 NOTE — LACTATION NOTE
This note was copied from a baby's chart. Mother attempting to BF baby. Assisted mother with positioning to latch. Baby latched immediately with rhythmic sucking. BF basics reviewed with family. Discussed with mother her plan for feeding. Reviewed the benefits of exclusive breast milk feeding during the hospital stay. Informed her of the risks of using formula to supplement in the first few days of life as well as the benefits of successful breast milk feeding; referred her to the Breastfeeding booklet about this information. She acknowledges understanding of information reviewed and states that it is her plan to breastfeed her infant. Will support her choice and offer additional information as needed. Reviewed breastfeeding basics:  How milk is made and normal  breastfeeding behaviors discussed. Supply and demand,  stomach size, early feeding cues, skin to skin bonding with comfortable positioning and baby led latch-on reviewed. How to identify signs of successful breastfeeding sessions reviewed; education on assymetrical latch, signs of effective latching vs shallow, in-effective latching, normal  feeding frequency and duration and expected infant output discussed. Normal course of breastfeeding discussed including the AAP's recommendation that children receive exclusive breast milk feedings for the first six months of life with breast milk feedings to continue through the first year of life and/or beyond as complimentary table foods are added. Breastfeeding Booklet and Warm line information provided with discussion. Discussed typical  weight loss and the importance of pediatrician appointment within 24-48 hours of discharge, at 2 weeks of life and normalcy of requesting pediatric weight checks as needed in between visits.     Pt will successfully establish breastfeeding by feeding in response to early feeding cues or wake every 3h, will obtain deep latch, and will keep log of feedings/output. Taught to BF at hunger cues and or q 2-3 hrs and to offer 10-20 drops of hand expressed colostrum at any non-feeds.       Breast Assessment  Left Breast: Small , Medium  Left Nipple: Everted, Intact  Right Breast: Small , Medium  Right Nipple: Everted, Intact  Breast- Feeding Assessment  Attends Breast-Feeding Classes: No  Breast-Feeding Experience: Yes(BF 1st child for 1 year)  Breast Trauma/Surgery: No  Type/Quality: Good  Lactation Consultant Visits  Breast-Feedings: Good   Mother/Infant Observation  Mother Observation: Breast comfortable, Lets baby end feeding, Nipple round on release, Recognizes feeding cues  Infant Observation: Rhythmic suck, Relaxed after feeding, Opens mouth, Lips flanged, upper, Lips flanged, lower, Latches nipple and aereolae, Feeding cues  LATCH Documentation  Latch: Grasps breast, tongue down, lips flanged, rhythmic sucking  Audible Swallowing: A few with stimulation  Type of Nipple: Everted (after stimulation)  Comfort (Breast/Nipple): Soft/non-tender  Hold (Positioning): Full assist, teach one side, mother does other, staff holds(assisted with positioning of baby at breast)  LATCH Score: 8

## 2020-01-10 PROCEDURE — 65270000029 HC RM PRIVATE

## 2020-01-10 PROCEDURE — 74011250637 HC RX REV CODE- 250/637: Performed by: OBSTETRICS & GYNECOLOGY

## 2020-01-10 RX ADMIN — IBUPROFEN 800 MG: 800 TABLET ORAL at 03:10

## 2020-01-10 NOTE — LACTATION NOTE
This note was copied from a baby's chart. Mother sitting up in bed. Family visiting in room. Mother states baby has been latching on well and breastfeeding well. LC discussed the following:    Reviewed breastfeeding basics:  Supply and demand, breastfeed baby 8-12 times in 24 hr.,   stomach size, early  Feeding cues, skin to skin, positioning and baby led latch-on, assymetrical latch with signs of good, deep latch vs shallow, feeding frequency and duration, and log sheet for tracking infant feedings and output. Breastfeeding Booklet and Warm line information given. Discussed typical  weight loss and the importance of infant weight checks with pediatrician 1-2 post discharge. Discussed what to do if nipples become sore. Care for sore/tender nipples discussed:  ways to improve positioning and latch practiced and discussed, hand express colostrum after feedings and let air dry, light application of lanolin, hydrogel pads, seek comfortable laid back feeding position, start feedings on least sore side first.    Mother will successfully establish breastfeeding by feeding in response to early feeding cues   or wake every 3h, will obtain deep latch, and will keep log of feedings/output. Taught to BF at hunger cues and or q 2-3 hrs and to offer 10-20 drops of hand expressed colostrum at any non-feeds. Breast Assessment  Left Breast: Small , Medium  Left Nipple: Everted, Intact  Right Breast: Small , Medium  Right Nipple: Everted, Intact  Breast- Feeding Assessment  Attends Breast-Feeding Classes: No  Breast-Feeding Experience: Yes  Breast Trauma/Surgery: No  Type/Quality: Good  Lactation Consultant Visits  Breast-Feedings: (Mother states baby last breast fed at 1400.  Instructed mother to call [phone] for breastfeeding assistance. )

## 2020-01-10 NOTE — PROGRESS NOTES
PostPartum Note    Crystal Parra  [id number]  1990  30 y.o.    S:  Ms. Crystal Parra is a 30 y.o.  PPD #1 s/p  @ 40w3d. Doing well. She had a baby girl. Her lochia is like a period. She describes her pain as mild and is well controlled with PO medications. She is breast feeding and this is going well. She is ambulating and voiding. Tolerating PO intake. O:   Visit Vitals  /63 (BP 1 Location: Left arm, BP Patient Position: At rest)   Pulse 62   Temp 97.8 °F (36.6 °C)   Resp 16   Ht 5' 4\" (1.626 m)   Wt 73.5 kg (162 lb)   SpO2 94%   Breastfeeding Unknown   BMI 27.81 kg/m²       Lab Results   Component Value Date/Time    WBC 13.5 (H) 2020 01:38 PM    HGB 14.0 2020 01:38 PM    HCT 39.4 2020 01:38 PM    PLATELET 000 (L)  01:38 PM    MCV 95.4 2020 01:38 PM    Hgb, External 12.2 2019    Hct, External 35.3 2019    Platelet cnt., External 133 2019       Gen - No acute distress  Abdomen - Fundus firm, below the umbilicus   Ext - Warm, well perfused. Nontender    A/P:  PPD #1 s/p  @ 40w3d doing well. 1.  Routine PP instructions/ care discussed  2. Blood type - Rh +  3. Rubella imm  4. Circumcision n/a   5. Discharge PPD#2    6. F/U 4-6 weeks for PP check.       Suma Leggett MD  [facility]

## 2020-01-10 NOTE — ANESTHESIA POSTPROCEDURE EVALUATION
* No procedures listed *.    epidural    Anesthesia Post Evaluation      Multimodal analgesia: multimodal analgesia used between 6 hours prior to anesthesia start to PACU discharge  Patient location during evaluation: PACU  Patient participation: complete - patient participated  Level of consciousness: awake and alert  Pain management: adequate  Airway patency: patent  Anesthetic complications: no  Cardiovascular status: acceptable  Respiratory status: acceptable  Hydration status: acceptable  Post anesthesia nausea and vomiting:  none      No vitals data found for the desired time range.

## 2020-01-10 NOTE — ROUTINE PROCESS
Bedside and Verbal shift change report given to Harish Caballero RN (oncoming nurse) by Con Bence, RN (offgoing nurse). Report included the following information SBAR.

## 2020-01-11 VITALS
WEIGHT: 162 LBS | RESPIRATION RATE: 16 BRPM | OXYGEN SATURATION: 94 % | TEMPERATURE: 98.5 F | SYSTOLIC BLOOD PRESSURE: 109 MMHG | HEART RATE: 68 BPM | BODY MASS INDEX: 27.66 KG/M2 | HEIGHT: 64 IN | DIASTOLIC BLOOD PRESSURE: 61 MMHG

## 2020-01-11 NOTE — LACTATION NOTE
This note was copied from a baby's chart. Mother and baby for discharge today. Mother states baby has been latching on well and breast feeding well. Her breasts are slightly waggoner today. LC discussed the following:    Reviewed breastfeeding basics:  Supply and demand, breastfeed baby 8-12 times in 24 hr., feed baby on demand,  stomach size, early  Feeding cues, skin to skin, positioning and baby led latch-on, assymetrical latch with signs of good, deep latch vs shallow, feeding frequency and duration, and log sheet for tracking infant feedings and output. Breastfeeding Booklet and Warm line information given. Discussed typical  weight loss and the importance of infant weight checks with pediatrician 1-2 post discharge. Engorgement Care Guidelines:  Reviewed how milk is made and normal phases of milk production. Taught care of engorged breasts - frequent breastfeeding encouraged, cool packs and motrin as tolerated. Anticipatory guidance shared. Care for sore/tender nipples discussed:  ways to improve positioning and latch practiced and discussed, hand express colostrum after feedings and let air dry, light application of lanolin, hydrogel pads, seek comfortable laid back feeding position, start feedings on least sore side first.    Discussed eating a healthy diet. Instructed mother to eat a variety of foods in order to get a well balanced diet. She should consume an extra 500 calories per day (more than her non-pregnant requirement.) These extra calories will help provide energy needed for optimal breast milk production. Mother also encouraged to \"drink to thirst\" and it is recommended that she drink fluids such as water, fruit/vegetable juice. Nutritious snacks should be available so that she can eat throughout the day to help satisfy her hunger and maintain a good milk supply.     Mother will successfully establish breastfeeding by feeding in response to early feeding cues   or wake every 3h, will obtain deep latch, and will keep log of feedings/output. Taught to BF at hunger cues and or q 2-3 hrs and to offer 10-20 drops of hand expressed colostrum at any non-feeds. Breast Assessment  Left Breast: Medium  Left Nipple: Everted, Intact  Right Breast: Medium  Right Nipple: Everted, Intact  Breast- Feeding Assessment  Attends Breast-Feeding Classes: No  Breast-Feeding Experience: Yes  Breast Trauma/Surgery: No  Type/Quality: Good(Per mother)  Lactation Consultant Visits  Breast-Feedings: Good (Mother and baby for D/C. Mother able to get baby to latch on well to left breast and baby breast fed well. )  Mother/Infant Observation  Mother Observation: Alignment, Holds breast, Breast comfortable, Close hold, Recognizes feeding cues  Infant Observation: Audible swallows, Lips flanged, upper, Opens mouth, Feeding cues, Rhythmic suck, Latches nipple and aereolae, Lips flanged, lower  LATCH Documentation  Latch: Grasps breast, tongue down, lips flanged, rhythmic sucking  Audible Swallowing: A few with stimulation  Type of Nipple: Everted (after stimulation)  Comfort (Breast/Nipple): Soft/non-tender  Hold (Positioning): No assist from staff, mother able to position/hold infant  LATCH Score: 9     Chart shows numerous feedings, void, stool WNL. Discussed importance of monitoring outputs and feedings on first week of life. Discussed ways to tell if baby is  getting enough breast milk, ie  voids and stools, change in color of stool, and return to birth wt within 2 weeks. Follow up with pediatrician visit for weight check in 1-2 days (per AAP guidelines.)  Encouraged to call Warm Line  [phone]  for any questions/problems that arise.  Mother also given breastfeeding support group dates and times for any future needs

## 2020-01-11 NOTE — ROUTINE PROCESS
Bedside and Verbal shift change report given to Felix Dixon RN (oncoming nurse) by Janet Fabian RN (offgoing nurse). Report included the following information SBAR, Kardex, Intake/Output, MAR and Accordion.

## 2020-01-11 NOTE — DISCHARGE SUMMARY
Obstetrical Discharge Summary     Name: Andre Palacio MRN: [id number]  SSN: [id number]    YOB: 1990  Age: 30 y.o. Sex: female      Allergies: Sulfa (sulfonamide antibiotics)    Admit Date: 2020    Discharge Date: 2020     Admitting Physician: Rosa Maria Ye MD     Attending Physician:  Hoang Morse MD     * Admission Diagnoses: Pregnancy [Z34.90]    * Discharge Diagnoses:   Information for the patient's :  Celsa Hodgkin Female Raquel Yinker [[id number]]   Delivery of a 3.66 kg female infant via Vaginal, Spontaneous on 2020 at 2:00 PM  by [facility]. Apgars were 8  and 9 .   2nd degree perineal laceration     Additional Diagnoses:   Hospital Problems as of 2020 Never Reviewed          Codes Class Noted - Resolved POA    Pregnancy ICD-10-CM: Z34.90  ICD-9-CM: V22.2  3/3/2017 - Present Unknown             Lab Results   Component Value Date/Time    Rubella, External immune 2019    GrBStrep, External negative 2019    ABO,Rh O positive 2019    There is no immunization history for the selected administration types on file for this patient.     * Procedures: IOL,          [place]  Depression Scale  I have been able to laugh and see the funny side of things: As much as I always could  I have looked forward with enjoyment to things: As much as I ever did  I have blamed myself unnecessarily when things went wrong: Not very often  I have been anxious or worried for no good reason: No, not at all  I have felt scared or panicky for no very good reason: No, not at all  Things have been getting on top of me: No, most of the time I have coped quite well  I have been so unhappy that I have had difficulty sleeping: No, not at all  I have felt sad or miserable: No, not at all  I have been so unhappy that I have been crying: No, never  The thought of harming myself has occurred to me: Never  Total Score: 2    * Discharge Condition: good    * Hospital Course: Normal hospital course following the delivery.     * Disposition: Home    Discharge Medications:   Current Discharge Medication List            Follow-up Information    None
